# Patient Record
Sex: MALE | Race: WHITE | NOT HISPANIC OR LATINO | Employment: PART TIME
[De-identification: names, ages, dates, MRNs, and addresses within clinical notes are randomized per-mention and may not be internally consistent; named-entity substitution may affect disease eponyms.]

---

## 2017-02-14 ENCOUNTER — RECORDS - HEALTHEAST (OUTPATIENT)
Dept: ADMINISTRATIVE | Facility: OTHER | Age: 77
End: 2017-02-14

## 2017-03-01 ENCOUNTER — RECORDS - HEALTHEAST (OUTPATIENT)
Dept: LAB | Facility: CLINIC | Age: 77
End: 2017-03-01

## 2017-03-01 LAB
CHOLEST SERPL-MCNC: 167 MG/DL
FASTING STATUS PATIENT QL REPORTED: YES
HDLC SERPL-MCNC: 46 MG/DL
LDLC SERPL CALC-MCNC: 89 MG/DL
TRIGL SERPL-MCNC: 158 MG/DL

## 2017-08-31 ENCOUNTER — RECORDS - HEALTHEAST (OUTPATIENT)
Dept: LAB | Facility: CLINIC | Age: 77
End: 2017-08-31

## 2017-08-31 LAB
CHOLEST SERPL-MCNC: 158 MG/DL
FASTING STATUS PATIENT QL REPORTED: YES
HDLC SERPL-MCNC: 42 MG/DL
LDLC SERPL CALC-MCNC: 74 MG/DL
TRIGL SERPL-MCNC: 212 MG/DL

## 2017-09-10 ENCOUNTER — COMMUNICATION - HEALTHEAST (OUTPATIENT)
Dept: INTERNAL MEDICINE | Facility: CLINIC | Age: 77
End: 2017-09-10

## 2017-09-10 DIAGNOSIS — E78.5 HYPERLIPEMIA: ICD-10-CM

## 2017-09-28 ENCOUNTER — OFFICE VISIT - HEALTHEAST (OUTPATIENT)
Dept: INTERNAL MEDICINE | Facility: CLINIC | Age: 77
End: 2017-09-28

## 2017-09-28 DIAGNOSIS — Z00.00 ROUTINE GENERAL MEDICAL EXAMINATION AT A HEALTH CARE FACILITY: ICD-10-CM

## 2017-09-28 ASSESSMENT — MIFFLIN-ST. JEOR: SCORE: 1779.73

## 2017-12-10 ENCOUNTER — COMMUNICATION - HEALTHEAST (OUTPATIENT)
Dept: INTERNAL MEDICINE | Facility: CLINIC | Age: 77
End: 2017-12-10

## 2017-12-10 DIAGNOSIS — E78.5 HYPERLIPEMIA: ICD-10-CM

## 2018-01-09 ENCOUNTER — RECORDS - HEALTHEAST (OUTPATIENT)
Dept: LAB | Facility: CLINIC | Age: 78
End: 2018-01-09

## 2018-01-09 LAB
ALBUMIN SERPL-MCNC: 4.1 G/DL (ref 3.5–5)
ALT SERPL W P-5'-P-CCNC: 59 U/L (ref 0–45)
AST SERPL W P-5'-P-CCNC: 46 U/L (ref 0–40)
C REACTIVE PROTEIN LHE: 0.2 MG/DL (ref 0–0.8)
CHOLEST SERPL-MCNC: 167 MG/DL
CREAT SERPL-MCNC: 0.98 MG/DL (ref 0.7–1.3)
ERYTHROCYTE [DISTWIDTH] IN BLOOD BY AUTOMATED COUNT: 13.2 % (ref 11–14.5)
ERYTHROCYTE [SEDIMENTATION RATE] IN BLOOD BY WESTERGREN METHOD: 14 MM/HR (ref 0–15)
FASTING STATUS PATIENT QL REPORTED: NO
GFR SERPL CREATININE-BSD FRML MDRD: >60 ML/MIN/1.73M2
HCT VFR BLD AUTO: 45.9 % (ref 40–54)
HDLC SERPL-MCNC: 44 MG/DL
HGB BLD-MCNC: 15.7 G/DL (ref 14–18)
LDLC SERPL CALC-MCNC: 90 MG/DL
MCH RBC QN AUTO: 29.2 PG (ref 27–34)
MCHC RBC AUTO-ENTMCNC: 34.2 G/DL (ref 32–36)
MCV RBC AUTO: 86 FL (ref 80–100)
PLATELET # BLD AUTO: 219 THOU/UL (ref 140–440)
PMV BLD AUTO: 11 FL (ref 8.5–12.5)
RBC # BLD AUTO: 5.37 MILL/UL (ref 4.4–6.2)
TRIGL SERPL-MCNC: 164 MG/DL
URATE SERPL-MCNC: 5.5 MG/DL (ref 3–8)
WBC: 5.8 THOU/UL (ref 4–11)

## 2018-01-10 LAB — 25(OH)D3 SERPL-MCNC: 72.7 NG/ML (ref 30–80)

## 2018-07-03 ENCOUNTER — RECORDS - HEALTHEAST (OUTPATIENT)
Dept: LAB | Facility: CLINIC | Age: 78
End: 2018-07-03

## 2018-07-03 LAB
25(OH)D3 SERPL-MCNC: 38.4 NG/ML (ref 30–80)
ALBUMIN SERPL-MCNC: 4.3 G/DL (ref 3.5–5)
ALT SERPL W P-5'-P-CCNC: 38 U/L (ref 0–45)
AST SERPL W P-5'-P-CCNC: 26 U/L (ref 0–40)
C REACTIVE PROTEIN LHE: 0.2 MG/DL (ref 0–0.8)
CHOLEST SERPL-MCNC: 171 MG/DL
CREAT SERPL-MCNC: 1.18 MG/DL (ref 0.7–1.3)
ERYTHROCYTE [DISTWIDTH] IN BLOOD BY AUTOMATED COUNT: 13.2 % (ref 11–14.5)
ERYTHROCYTE [SEDIMENTATION RATE] IN BLOOD BY WESTERGREN METHOD: 18 MM/HR (ref 0–15)
FASTING STATUS PATIENT QL REPORTED: YES
GFR SERPL CREATININE-BSD FRML MDRD: 60 ML/MIN/1.73M2
HCT VFR BLD AUTO: 45.9 % (ref 40–54)
HDLC SERPL-MCNC: 47 MG/DL
HGB BLD-MCNC: 15.6 G/DL (ref 14–18)
LDLC SERPL CALC-MCNC: 83 MG/DL
MCH RBC QN AUTO: 29.5 PG (ref 27–34)
MCHC RBC AUTO-ENTMCNC: 34 G/DL (ref 32–36)
MCV RBC AUTO: 87 FL (ref 80–100)
PLATELET # BLD AUTO: 216 THOU/UL (ref 140–440)
PMV BLD AUTO: 10.2 FL (ref 8.5–12.5)
RBC # BLD AUTO: 5.29 MILL/UL (ref 4.4–6.2)
TRIGL SERPL-MCNC: 203 MG/DL
URATE SERPL-MCNC: 4.8 MG/DL (ref 3–8)
WBC: 6.6 THOU/UL (ref 4–11)

## 2018-09-09 ENCOUNTER — COMMUNICATION - HEALTHEAST (OUTPATIENT)
Dept: INTERNAL MEDICINE | Facility: CLINIC | Age: 78
End: 2018-09-09

## 2018-09-09 DIAGNOSIS — E78.5 HYPERLIPEMIA: ICD-10-CM

## 2018-12-11 ENCOUNTER — COMMUNICATION - HEALTHEAST (OUTPATIENT)
Dept: INTERNAL MEDICINE | Facility: CLINIC | Age: 78
End: 2018-12-11

## 2018-12-11 DIAGNOSIS — E78.5 HYPERLIPEMIA: ICD-10-CM

## 2019-01-13 ENCOUNTER — COMMUNICATION - HEALTHEAST (OUTPATIENT)
Dept: INTERNAL MEDICINE | Facility: CLINIC | Age: 79
End: 2019-01-13

## 2019-01-13 DIAGNOSIS — E78.5 HYPERLIPEMIA: ICD-10-CM

## 2019-01-16 ENCOUNTER — RECORDS - HEALTHEAST (OUTPATIENT)
Dept: LAB | Facility: CLINIC | Age: 79
End: 2019-01-16

## 2019-01-16 LAB
ALBUMIN SERPL-MCNC: 4.2 G/DL (ref 3.5–5)
ALT SERPL W P-5'-P-CCNC: 44 U/L (ref 0–45)
AST SERPL W P-5'-P-CCNC: 33 U/L (ref 0–40)
C REACTIVE PROTEIN LHE: 0.2 MG/DL (ref 0–0.8)
CHOLEST SERPL-MCNC: 166 MG/DL
CREAT SERPL-MCNC: 1.08 MG/DL (ref 0.7–1.3)
ERYTHROCYTE [DISTWIDTH] IN BLOOD BY AUTOMATED COUNT: 13 % (ref 11–14.5)
ERYTHROCYTE [SEDIMENTATION RATE] IN BLOOD BY WESTERGREN METHOD: 11 MM/HR (ref 0–15)
FASTING STATUS PATIENT QL REPORTED: NO
GFR SERPL CREATININE-BSD FRML MDRD: >60 ML/MIN/1.73M2
HCT VFR BLD AUTO: 44.9 % (ref 40–54)
HDLC SERPL-MCNC: 45 MG/DL
HGB BLD-MCNC: 15.3 G/DL (ref 14–18)
LDLC SERPL CALC-MCNC: 70 MG/DL
MCH RBC QN AUTO: 29.7 PG (ref 27–34)
MCHC RBC AUTO-ENTMCNC: 34.1 G/DL (ref 32–36)
MCV RBC AUTO: 87 FL (ref 80–100)
PLATELET # BLD AUTO: 148 THOU/UL (ref 140–440)
PMV BLD AUTO: 11.5 FL (ref 8.5–12.5)
RBC # BLD AUTO: 5.16 MILL/UL (ref 4.4–6.2)
TRIGL SERPL-MCNC: 255 MG/DL
URATE SERPL-MCNC: 5.4 MG/DL (ref 3–8)
WBC: 7.1 THOU/UL (ref 4–11)

## 2019-01-17 LAB — 25(OH)D3 SERPL-MCNC: 39.3 NG/ML (ref 30–80)

## 2019-01-26 ENCOUNTER — COMMUNICATION - HEALTHEAST (OUTPATIENT)
Dept: INTERNAL MEDICINE | Facility: CLINIC | Age: 79
End: 2019-01-26

## 2019-01-26 DIAGNOSIS — E78.5 HYPERLIPEMIA: ICD-10-CM

## 2019-02-01 ENCOUNTER — OFFICE VISIT - HEALTHEAST (OUTPATIENT)
Dept: INTERNAL MEDICINE | Facility: CLINIC | Age: 79
End: 2019-02-01

## 2019-02-01 DIAGNOSIS — E78.5 HYPERLIPEMIA: ICD-10-CM

## 2019-04-20 ENCOUNTER — COMMUNICATION - HEALTHEAST (OUTPATIENT)
Dept: INTERNAL MEDICINE | Facility: CLINIC | Age: 79
End: 2019-04-20

## 2019-04-20 DIAGNOSIS — E78.5 HYPERLIPEMIA: ICD-10-CM

## 2019-07-08 ENCOUNTER — RECORDS - HEALTHEAST (OUTPATIENT)
Dept: ADMINISTRATIVE | Facility: OTHER | Age: 79
End: 2019-07-08

## 2019-07-09 ENCOUNTER — RECORDS - HEALTHEAST (OUTPATIENT)
Dept: LAB | Facility: CLINIC | Age: 79
End: 2019-07-09

## 2019-07-09 LAB
25(OH)D3 SERPL-MCNC: 43 NG/ML (ref 30–80)
ALBUMIN SERPL-MCNC: 4.1 G/DL (ref 3.5–5)
ALT SERPL W P-5'-P-CCNC: 32 U/L (ref 0–45)
AST SERPL W P-5'-P-CCNC: 29 U/L (ref 0–40)
C REACTIVE PROTEIN LHE: 0.2 MG/DL (ref 0–0.8)
CHOLEST SERPL-MCNC: 162 MG/DL
CREAT SERPL-MCNC: 0.89 MG/DL (ref 0.7–1.3)
ERYTHROCYTE [DISTWIDTH] IN BLOOD BY AUTOMATED COUNT: 12.8 % (ref 11–14.5)
ERYTHROCYTE [SEDIMENTATION RATE] IN BLOOD BY WESTERGREN METHOD: 14 MM/HR (ref 0–15)
FASTING STATUS PATIENT QL REPORTED: YES
GFR SERPL CREATININE-BSD FRML MDRD: >60 ML/MIN/1.73M2
HCT VFR BLD AUTO: 43.7 % (ref 40–54)
HDLC SERPL-MCNC: 45 MG/DL
HGB BLD-MCNC: 14.8 G/DL (ref 14–18)
LDLC SERPL CALC-MCNC: 89 MG/DL
MCH RBC QN AUTO: 29.3 PG (ref 27–34)
MCHC RBC AUTO-ENTMCNC: 33.9 G/DL (ref 32–36)
MCV RBC AUTO: 87 FL (ref 80–100)
PLATELET # BLD AUTO: 216 THOU/UL (ref 140–440)
PMV BLD AUTO: 10.8 FL (ref 8.5–12.5)
RBC # BLD AUTO: 5.05 MILL/UL (ref 4.4–6.2)
TRIGL SERPL-MCNC: 138 MG/DL
URATE SERPL-MCNC: 4.9 MG/DL (ref 3–8)
WBC: 6 THOU/UL (ref 4–11)

## 2020-01-07 ENCOUNTER — RECORDS - HEALTHEAST (OUTPATIENT)
Dept: LAB | Facility: CLINIC | Age: 80
End: 2020-01-07

## 2020-01-07 LAB
ALBUMIN SERPL-MCNC: 4.4 G/DL (ref 3.5–5)
ALT SERPL W P-5'-P-CCNC: 34 U/L (ref 0–45)
AST SERPL W P-5'-P-CCNC: 24 U/L (ref 0–40)
C REACTIVE PROTEIN LHE: 0.2 MG/DL (ref 0–0.8)
CHOLEST SERPL-MCNC: 157 MG/DL
CREAT SERPL-MCNC: 0.95 MG/DL (ref 0.7–1.3)
ERYTHROCYTE [DISTWIDTH] IN BLOOD BY AUTOMATED COUNT: 13 % (ref 11–14.5)
ERYTHROCYTE [SEDIMENTATION RATE] IN BLOOD BY WESTERGREN METHOD: 16 MM/HR (ref 0–15)
FASTING STATUS PATIENT QL REPORTED: YES
GFR SERPL CREATININE-BSD FRML MDRD: >60 ML/MIN/1.73M2
HCT VFR BLD AUTO: 45.2 % (ref 40–54)
HDLC SERPL-MCNC: 46 MG/DL
HGB BLD-MCNC: 15.3 G/DL (ref 14–18)
LDLC SERPL CALC-MCNC: 90 MG/DL
MCH RBC QN AUTO: 29.2 PG (ref 27–34)
MCHC RBC AUTO-ENTMCNC: 33.8 G/DL (ref 32–36)
MCV RBC AUTO: 86 FL (ref 80–100)
PLATELET # BLD AUTO: 232 THOU/UL (ref 140–440)
PMV BLD AUTO: 11 FL (ref 8.5–12.5)
RBC # BLD AUTO: 5.24 MILL/UL (ref 4.4–6.2)
TRIGL SERPL-MCNC: 107 MG/DL
URATE SERPL-MCNC: 5.4 MG/DL (ref 3–8)
WBC: 5.6 THOU/UL (ref 4–11)

## 2020-01-08 ENCOUNTER — COMMUNICATION - HEALTHEAST (OUTPATIENT)
Dept: INTERNAL MEDICINE | Facility: CLINIC | Age: 80
End: 2020-01-08

## 2020-01-08 DIAGNOSIS — E78.5 HYPERLIPEMIA: ICD-10-CM

## 2020-01-08 LAB — 25(OH)D3 SERPL-MCNC: 51.3 NG/ML (ref 30–80)

## 2020-07-09 ENCOUNTER — RECORDS - HEALTHEAST (OUTPATIENT)
Dept: LAB | Facility: CLINIC | Age: 80
End: 2020-07-09

## 2020-07-09 LAB
ALBUMIN SERPL-MCNC: 4.3 G/DL (ref 3.5–5)
ALT SERPL W P-5'-P-CCNC: 32 U/L (ref 0–45)
AST SERPL W P-5'-P-CCNC: 21 U/L (ref 0–40)
C REACTIVE PROTEIN LHE: 0.5 MG/DL (ref 0–0.8)
CALCIUM SERPL-MCNC: 10 MG/DL (ref 8.5–10.5)
CREAT SERPL-MCNC: 0.94 MG/DL (ref 0.7–1.3)
ERYTHROCYTE [DISTWIDTH] IN BLOOD BY AUTOMATED COUNT: 13.2 % (ref 11–14.5)
ERYTHROCYTE [SEDIMENTATION RATE] IN BLOOD BY WESTERGREN METHOD: 17 MM/HR (ref 0–15)
GFR SERPL CREATININE-BSD FRML MDRD: >60 ML/MIN/1.73M2
HCT VFR BLD AUTO: 46.7 % (ref 40–54)
HGB BLD-MCNC: 15.6 G/DL (ref 14–18)
MCH RBC QN AUTO: 29.6 PG (ref 27–34)
MCHC RBC AUTO-ENTMCNC: 33.4 G/DL (ref 32–36)
MCV RBC AUTO: 89 FL (ref 80–100)
PLATELET # BLD AUTO: 229 THOU/UL (ref 140–440)
PMV BLD AUTO: 11 FL (ref 8.5–12.5)
RBC # BLD AUTO: 5.27 MILL/UL (ref 4.4–6.2)
URATE SERPL-MCNC: 5.7 MG/DL (ref 3–8)
WBC: 7 THOU/UL (ref 4–11)

## 2020-07-10 LAB — 25(OH)D3 SERPL-MCNC: 64.7 NG/ML (ref 30–80)

## 2020-10-31 ENCOUNTER — COMMUNICATION - HEALTHEAST (OUTPATIENT)
Dept: INTERNAL MEDICINE | Facility: CLINIC | Age: 80
End: 2020-10-31

## 2020-10-31 DIAGNOSIS — E78.5 HYPERLIPEMIA: ICD-10-CM

## 2020-11-04 RX ORDER — SIMVASTATIN 20 MG
TABLET ORAL
Qty: 90 TABLET | Refills: 2 | Status: SHIPPED | OUTPATIENT
Start: 2020-11-04 | End: 2021-08-26

## 2021-01-15 ENCOUNTER — COMMUNICATION - HEALTHEAST (OUTPATIENT)
Dept: TELEHEALTH | Facility: CLINIC | Age: 81
End: 2021-01-15

## 2021-01-15 ENCOUNTER — OFFICE VISIT - HEALTHEAST (OUTPATIENT)
Dept: INTERNAL MEDICINE | Facility: CLINIC | Age: 81
End: 2021-01-15

## 2021-01-15 DIAGNOSIS — I65.23 BILATERAL CAROTID ARTERY STENOSIS: ICD-10-CM

## 2021-01-15 DIAGNOSIS — Z00.00 ROUTINE GENERAL MEDICAL EXAMINATION AT A HEALTH CARE FACILITY: ICD-10-CM

## 2021-01-15 DIAGNOSIS — K42.9 UMBILICAL HERNIA WITHOUT OBSTRUCTION AND WITHOUT GANGRENE: ICD-10-CM

## 2021-01-15 DIAGNOSIS — Z00.01 ENCOUNTER FOR GENERAL ADULT MEDICAL EXAMINATION WITH ABNORMAL FINDINGS: ICD-10-CM

## 2021-01-15 DIAGNOSIS — M10.9 ACUTE GOUTY ARTHRITIS: ICD-10-CM

## 2021-01-15 DIAGNOSIS — E78.2 MIXED HYPERLIPIDEMIA: ICD-10-CM

## 2021-01-15 DIAGNOSIS — I10 BENIGN ESSENTIAL HYPERTENSION: ICD-10-CM

## 2021-01-15 DIAGNOSIS — I66.9 CEREBRAL ARTERY OCCLUSION: ICD-10-CM

## 2021-01-15 LAB
ALBUMIN SERPL-MCNC: 4.4 G/DL (ref 3.5–5)
ALP SERPL-CCNC: 71 U/L (ref 45–120)
ALT SERPL W P-5'-P-CCNC: 50 U/L (ref 0–45)
ANION GAP SERPL CALCULATED.3IONS-SCNC: 10 MMOL/L (ref 5–18)
AST SERPL W P-5'-P-CCNC: 37 U/L (ref 0–40)
BILIRUB SERPL-MCNC: 0.6 MG/DL (ref 0–1)
BUN SERPL-MCNC: 14 MG/DL (ref 8–28)
CALCIUM SERPL-MCNC: 10.2 MG/DL (ref 8.5–10.5)
CHLORIDE BLD-SCNC: 102 MMOL/L (ref 98–107)
CHOLEST SERPL-MCNC: 151 MG/DL
CO2 SERPL-SCNC: 25 MMOL/L (ref 22–31)
CREAT SERPL-MCNC: 1.03 MG/DL (ref 0.7–1.3)
FASTING STATUS PATIENT QL REPORTED: YES
GFR SERPL CREATININE-BSD FRML MDRD: >60 ML/MIN/1.73M2
GLUCOSE BLD-MCNC: 145 MG/DL (ref 70–125)
HDLC SERPL-MCNC: 46 MG/DL
LDLC SERPL CALC-MCNC: 82 MG/DL
POTASSIUM BLD-SCNC: 4.2 MMOL/L (ref 3.5–5)
PROT SERPL-MCNC: 7.7 G/DL (ref 6–8)
SODIUM SERPL-SCNC: 137 MMOL/L (ref 136–145)
TRIGL SERPL-MCNC: 115 MG/DL
URATE SERPL-MCNC: 5.6 MG/DL (ref 3–8)

## 2021-01-15 ASSESSMENT — MIFFLIN-ST. JEOR: SCORE: 1712.25

## 2021-01-18 ENCOUNTER — HOSPITAL ENCOUNTER (OUTPATIENT)
Dept: ULTRASOUND IMAGING | Facility: CLINIC | Age: 81
Discharge: HOME | End: 2021-01-18
Attending: INTERNAL MEDICINE

## 2021-01-18 DIAGNOSIS — I65.23 BILATERAL CAROTID ARTERY STENOSIS: ICD-10-CM

## 2021-01-27 ENCOUNTER — SURGERY - HEALTHEAST (OUTPATIENT)
Dept: SURGERY | Facility: CLINIC | Age: 81
End: 2021-01-27

## 2021-01-27 ENCOUNTER — AMBULATORY - HEALTHEAST (OUTPATIENT)
Dept: SURGERY | Facility: CLINIC | Age: 81
End: 2021-01-27

## 2021-01-27 ENCOUNTER — OFFICE VISIT - HEALTHEAST (OUTPATIENT)
Dept: SURGERY | Facility: CLINIC | Age: 81
End: 2021-01-27

## 2021-01-27 ENCOUNTER — COMMUNICATION - HEALTHEAST (OUTPATIENT)
Dept: SURGERY | Facility: CLINIC | Age: 81
End: 2021-01-27

## 2021-01-27 DIAGNOSIS — K43.9 VENTRAL HERNIA WITHOUT OBSTRUCTION OR GANGRENE: ICD-10-CM

## 2021-01-27 DIAGNOSIS — Z11.59 ENCOUNTER FOR SCREENING FOR OTHER VIRAL DISEASES: ICD-10-CM

## 2021-01-27 ASSESSMENT — MIFFLIN-ST. JEOR: SCORE: 1720.76

## 2021-01-28 ENCOUNTER — COMMUNICATION - HEALTHEAST (OUTPATIENT)
Dept: INTERNAL MEDICINE | Facility: CLINIC | Age: 81
End: 2021-01-28

## 2021-02-05 ENCOUNTER — OFFICE VISIT - HEALTHEAST (OUTPATIENT)
Dept: INTERNAL MEDICINE | Facility: CLINIC | Age: 81
End: 2021-02-05

## 2021-02-05 DIAGNOSIS — I10 BENIGN ESSENTIAL HYPERTENSION: ICD-10-CM

## 2021-02-05 DIAGNOSIS — Z01.818 PRE-OP EXAM: ICD-10-CM

## 2021-02-05 DIAGNOSIS — Z01.818 PREOP GENERAL PHYSICAL EXAM: ICD-10-CM

## 2021-02-05 DIAGNOSIS — M10.9 ACUTE GOUTY ARTHRITIS: ICD-10-CM

## 2021-02-05 LAB
ATRIAL RATE - MUSE: 47 BPM
DIASTOLIC BLOOD PRESSURE - MUSE: NORMAL
INTERPRETATION ECG - MUSE: NORMAL
P AXIS - MUSE: -29 DEGREES
PR INTERVAL - MUSE: 280 MS
QRS DURATION - MUSE: 84 MS
QT - MUSE: 466 MS
QTC - MUSE: 412 MS
R AXIS - MUSE: -28 DEGREES
SYSTOLIC BLOOD PRESSURE - MUSE: NORMAL
T AXIS - MUSE: -27 DEGREES
VENTRICULAR RATE- MUSE: 47 BPM

## 2021-02-05 RX ORDER — ALLOPURINOL 300 MG/1
300 TABLET ORAL DAILY
Qty: 90 TABLET | Refills: 3 | Status: SHIPPED | OUTPATIENT
Start: 2021-02-05 | End: 2022-02-24

## 2021-02-05 RX ORDER — AMLODIPINE BESYLATE 5 MG/1
TABLET ORAL
Qty: 90 TABLET | Refills: 3 | Status: SHIPPED | OUTPATIENT
Start: 2021-02-05

## 2021-02-06 ENCOUNTER — AMBULATORY - HEALTHEAST (OUTPATIENT)
Dept: FAMILY MEDICINE | Facility: CLINIC | Age: 81
End: 2021-02-06

## 2021-02-06 DIAGNOSIS — Z11.59 ENCOUNTER FOR SCREENING FOR OTHER VIRAL DISEASES: ICD-10-CM

## 2021-02-07 LAB
SARS-COV-2 PCR COMMENT: NORMAL
SARS-COV-2 RNA SPEC QL NAA+PROBE: NEGATIVE
SARS-COV-2 VIRUS SPECIMEN SOURCE: NORMAL

## 2021-02-08 ENCOUNTER — ANESTHESIA - HEALTHEAST (OUTPATIENT)
Dept: SURGERY | Facility: HOSPITAL | Age: 81
End: 2021-02-08

## 2021-02-08 ENCOUNTER — COMMUNICATION - HEALTHEAST (OUTPATIENT)
Dept: SCHEDULING | Facility: CLINIC | Age: 81
End: 2021-02-08

## 2021-02-09 ENCOUNTER — SURGERY - HEALTHEAST (OUTPATIENT)
Dept: SURGERY | Facility: HOSPITAL | Age: 81
End: 2021-02-09

## 2021-02-12 ENCOUNTER — COMMUNICATION - HEALTHEAST (OUTPATIENT)
Dept: SCHEDULING | Facility: CLINIC | Age: 81
End: 2021-02-12

## 2021-02-24 ENCOUNTER — OFFICE VISIT - HEALTHEAST (OUTPATIENT)
Dept: SURGERY | Facility: CLINIC | Age: 81
End: 2021-02-24

## 2021-02-24 DIAGNOSIS — Z98.890 POSTOPERATIVE STATE: ICD-10-CM

## 2021-03-24 ENCOUNTER — OFFICE VISIT - HEALTHEAST (OUTPATIENT)
Dept: SURGERY | Facility: CLINIC | Age: 81
End: 2021-03-24

## 2021-03-24 DIAGNOSIS — Z98.890 POSTOPERATIVE STATE: ICD-10-CM

## 2021-05-31 VITALS — WEIGHT: 234 LBS | HEIGHT: 70 IN | BODY MASS INDEX: 33.5 KG/M2

## 2021-06-02 VITALS — BODY MASS INDEX: 33.33 KG/M2 | WEIGHT: 229 LBS

## 2021-06-05 VITALS
SYSTOLIC BLOOD PRESSURE: 154 MMHG | DIASTOLIC BLOOD PRESSURE: 80 MMHG | HEIGHT: 69 IN | BODY MASS INDEX: 32.58 KG/M2 | HEART RATE: 60 BPM | WEIGHT: 220 LBS

## 2021-06-05 VITALS — WEIGHT: 223 LBS | BODY MASS INDEX: 32.46 KG/M2

## 2021-06-05 VITALS
SYSTOLIC BLOOD PRESSURE: 120 MMHG | HEIGHT: 70 IN | BODY MASS INDEX: 31.64 KG/M2 | DIASTOLIC BLOOD PRESSURE: 80 MMHG | WEIGHT: 221 LBS

## 2021-06-05 VITALS
HEART RATE: 64 BPM | DIASTOLIC BLOOD PRESSURE: 78 MMHG | BODY MASS INDEX: 32.46 KG/M2 | WEIGHT: 223 LBS | SYSTOLIC BLOOD PRESSURE: 132 MMHG

## 2021-06-05 NOTE — TELEPHONE ENCOUNTER
Refill Approved    Rx renewed per Medication Renewal Policy. Medication was last renewed on 2/1/19.    Cindy Ward, [facility] Connection Triage/Med Refill 1/8/2020     Requested Prescriptions   Pending Prescriptions Disp Refills     simvastatin (ZOCOR) 20 MG tablet [Pharmacy Med Name: SIMVASTATIN 20MG TABLETS] 90 tablet 3     Sig: TAKE 1 TABLET(20 MG) BY MOUTH AT BEDTIME       Statins Refill Protocol (Hmg CoA Reductase Inhibitors) Passed - 1/8/2020  5:07 AM        Passed - PCP or prescribing provider visit in past 12 months      Last office visit with prescriber/PCP: 2/1/2019 Yoandy Willis MD OR same dept: 2/1/2019 Yoandy Willis MD OR same specialty: 2/1/2019 Yoandy Willis MD  Last physical: 9/28/2017 Last MTM visit: Visit date not found   Next visit within 3 mo: Visit date not found  Next physical within 3 mo: Visit date not found  Prescriber OR PCP: Yoandy Willis MD  Last diagnosis associated with med order: 1. Hyperlipemia  - simvastatin (ZOCOR) 20 MG tablet [Pharmacy Med Name: SIMVASTATIN 20MG TABLETS]; TAKE 1 TABLET(20 MG) BY MOUTH AT BEDTIME  Dispense: 90 tablet; Refill: 3    If protocol passes may refill for 12 months if within 3 months of last provider visit (or a total of 15 months).

## 2021-06-12 NOTE — TELEPHONE ENCOUNTER
RN cannot approve Refill Request    RN can NOT refill this medication Protocol failed and NO refill given. Last office visit: 2/1/2019 Yoandy Willis MD Last Physical: 9/28/2017 Last MTM visit: Visit date not found Last visit same specialty: 2/1/2019 Yoandy Willis MD.  Next visit within 3 mo: Visit date not found  Next physical within 3 mo: Visit date not found      Carmen Murdock, [facility] [place] Connection Triage/Med Refill 11/3/2020    Requested Prescriptions   Pending Prescriptions Disp Refills     simvastatin (ZOCOR) 20 MG tablet [Pharmacy Med Name: SIMVASTATIN 20MG TABLETS] 90 tablet 2     Sig: TAKE 1 TABLET(20 MG) BY MOUTH AT BEDTIME       Statins Refill Protocol (Hmg CoA Reductase Inhibitors) Failed - 10/31/2020  8:44 PM        Failed - PCP or prescribing provider visit in past 12 months      Last office visit with prescriber/PCP: 2/1/2019 Yoandy Willis MD OR same dept: Visit date not found OR same specialty: 2/1/2019 Yoandy Willis MD  Last physical: 9/28/2017 Last MTM visit: Visit date not found   Next visit within 3 mo: Visit date not found  Next physical within 3 mo: Visit date not found  Prescriber OR PCP: Yoandy Willis MD  Last diagnosis associated with med order: 1. Hyperlipemia  - simvastatin (ZOCOR) 20 MG tablet [Pharmacy Med Name: SIMVASTATIN 20MG TABLETS]; TAKE 1 TABLET(20 MG) BY MOUTH AT BEDTIME  Dispense: 90 tablet; Refill: 2    If protocol passes may refill for 12 months if within 3 months of last provider visit (or a total of 15 months).

## 2021-06-13 NOTE — PROGRESS NOTES
Steven presents today for a physical exam.  Please see the physical exam forms a and B for further details, including a complete review of systems.    ILLNESSES, HOSPITALIZATIONS, AND OPERATIONS:    #1.  Psoriatic arthritis, well controlled off medication.  2.  Hypertension, on amlodipine.  3.  Hyperlipidemia.  4.  History of carotid artery stenosis status post carotid endarterectomy in 2013.  5.  History of CVA in 2012, no residual deficits.    Steven feels well today and has no acute complaints.  He is due for some immunizations.  Blood pressure is excellent and he would like to decrease his dose of amlodipine in half.  Again he is otherwise well.    OBJECTIVE:    In general the patient is alert pleasant and in no acute distress.    HEENT: Pupils equal round reactive light.  Oropharynx is clear.  Lymphatic shows no anterior posterior cervical lymphadenopathy.  No thyromegaly or other masses noted.    Cardiovascular: S1-S2 regular in rhythm no murmurs gallops rubs    Lungs are clear    Abdomen is soft nontender nondistended.  No HSM.    Extremities show no pedal edema present bilaterally.  DP pulses are 2+ and normal bilaterally.    Skin exam shows no concerning skin lesions.    Assessment and plan: Physical exam along with healthcare maintenance.  He was given his tetanus, flu, and pneumonia vaccines.  Colon cancer screening and prostate cancer screening are not needed at his age.  We will check a fasting lipid profile and a CMP.  Follow-up 1 year, earlier if needed

## 2021-06-14 NOTE — PROGRESS NOTES
Assessment and Plan:     1. Benign essential hypertension`    Blood pressure elevated today, but patient states that he is having a lot of anxiety today as well.  He is going to make an appointment in 2 weeks for nurse visit.  If his blood pressure continues to be elevated we will start him on either a thiazide diuretic or an ACE inhibitor.    - Comprehensive Metabolic Panel    2. Mixed hyperlipidemia    - Lipid Cascade    3. Ischemic Stroke    Secondary risk factor modification.    4. Routine general medical examination at a health care facility      5. Gouty arthritis    - Uric Acid    6. Umbilical hernia without obstruction and without gangrene    He desires surgical repair of this and thus I referred him to general surgery.    - Ambulatory referral to General Surgery     The patient's current medical problems were reviewed.      The following health maintenance schedule was reviewed with the patient and provided in printed form in the after visit summary:   Health Maintenance   Topic Date Due     HEPATITIS C SCREENING  1940     ZOSTER VACCINES (1 of 2) 07/13/1992     MEDICARE ANNUAL WELLNESS VISIT  09/28/2018     INFLUENZA VACCINE RULE BASED (1) 08/01/2020     ADVANCE CARE PLANNING  09/29/2021     FALL RISK ASSESSMENT  01/15/2022     LIPID  01/07/2025     TD 18+ HE  09/28/2027     Pneumococcal Vaccine: 65+ Years  Completed     Pneumococcal Vaccine: Pediatrics (0 to 5 Years) and At-Risk Patients (6 to 64 Years)  Aged Out     HEPATITIS B VACCINES  Aged Out        Subjective:   Chief Complaint: Steven Booker is an 80 y.o. male here for an Annual Wellness visit.   HPI: Steven comes in today for his yearly physical.  He is feeling well today and has no acute complaints.  He states that he has been dealing with an umbilical hernia for about the last 2years and it has slightly been enlarging.  He would like surgical repair of this if at all possible.  He has a history of psoriatic arthritis, in remission, and also  history of gout which she is on allopurinol prophylaxis for.  Due for labs today.  History of hypertension, on 10 mg of amlodipine.  History of vascular disease, secondary risk factor modification.    Review of Systems:   Please see above.  The rest of the review of systems are negative for all systems.    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 No pertinent past medical history.   Past Surgical History:   Procedure Laterality Date     [facility] THROMBOENDARTECTMY NECK,NECK INCIS      Description: Carotid Thromboendarterectomy;  Recorded: 12/02/2013;      No family history on file.   Social History     Socioeconomic History     Marital status:      Spouse name: Not on file     Number of children: Not on file     Years of education: Not on file     Highest education level: Not on file   Occupational History     Not on file   Social Needs     Financial resource strain: Not on file     Food insecurity     Worry: Not on file     Inability: Not on file     Transportation needs     Medical: Not on file     Non-medical: Not on file   Tobacco Use     Smoking status: Light Tobacco Smoker     Types: Cigars     Smokeless tobacco: Never Used   Substance and Sexual Activity     Alcohol use: Not on file     Drug use: Not on file     Sexual activity: Not on file   Lifestyle     Physical activity     Days per week: Not on file     Minutes per session: Not on file     Stress: Not on file   Relationships     Social connections     Talks on phone: Not on file     Gets together: Not on file     Attends Yazidi service: Not on file     Active member of club or organization: Not on file     Attends meetings of  "clubs or organizations: Not on file     Relationship status: Not on file     Intimate partner violence     Fear of current or ex partner: Not on file     Emotionally abused: Not on file     Physically abused: Not on file     Forced sexual activity: Not on file   Other Topics Concern     Not on file   Social History Narrative     Not on file      Current Outpatient Medications   Medication Sig Dispense Refill     allopurinol (ZYLOPRIM) 300 MG tablet Take 300 mg by mouth daily.        amLODIPine (NORVASC) 5 MG tablet TK 2 TS PO D  0     aspirin 81 MG EC tablet Take 81 mg by mouth daily.       cholecalciferol, vitamin D3, (VITAMIN D3) 2,000 unit capsule Take 2,000 Units by mouth daily.       simvastatin (ZOCOR) 20 MG tablet TAKE 1 TABLET(20 MG) BY MOUTH AT BEDTIME 90 tablet 2     No current facility-administered medications for this visit.       Objective:   Vital Signs:   Visit Vitals  /78   Pulse 60   Ht 5' 9.25\" (1.759 m)   Wt 220 lb (99.8 kg)   BMI 32.25 kg/m           VisionScreening:  No exam data present     PHYSICAL EXAM  OBJECTIVE:    In general the patient is alert pleasant and in no acute distress.    HEENT: Pupils equal round reactive light.  Oropharynx is clear.  Lymphatic shows no anterior posterior cervical lymphadenopathy.  No thyromegaly or other masses noted.    Cardiovascular: S1-S2 regular in rhythm no murmurs gallops rubs    Lungs are clear    Abdomen is soft nontender nondistended.  No HSM.    Extremities show no pedal edema present bilaterally.  DP pulses are 2+ and normal bilaterally.    Skin exam shows no concerning skin lesions.    Assessment Results 1/15/2021   Activities of Daily Living No help needed   Instrumental Activities of Daily Living No help needed   Mini Cog Total Score 5   Some recent data might be hidden     A Mini-Cog score of 0-2 suggests the possibility of dementia, score of 3-5 suggests no dementia    Identified Health Risks:     The patient was provided with written " information regarding signs of hearing loss.  Information regarding advance directives (living posada), including where he can download the appropriate form, was provided to the patient via the AVS.

## 2021-06-14 NOTE — PATIENT INSTRUCTIONS - HE
Hernia education & surgery packet provided to pt.    JUANITO Peguero [facility]  P: [phone] I F: [phone]

## 2021-06-14 NOTE — PROGRESS NOTES
HPI:  Steven Booker is a 80 y.o. male who was referred to me by Yoandy Willis MD for a ventral hernia.  He presents with complaints of a bulge in the periumbilical region with increasingly enlargement and dicomfort.   He has noted this for the past several years.  He denies any nausea, vomiting, fevers, chills, bloody bowel movements or any other complaints at this time.  Allergies:Patient has no known allergies.    No past medical history on file.    Past Surgical History:   Procedure Laterality Date     AR THROMBOENDARTECTMY NECK,NECK INCIS      Description: Carotid Thromboendarterectomy;  Recorded: 12/02/2013;       CURRENT MEDS:  Current Outpatient Medications   Medication Sig Dispense Refill     allopurinol (ZYLOPRIM) 300 MG tablet Take 300 mg by mouth daily.        amLODIPine (NORVASC) 5 MG tablet TK 2 TS PO D  0     aspirin 81 MG EC tablet Take 81 mg by mouth daily.       cholecalciferol, vitamin D3, (VITAMIN D3) 2,000 unit capsule Take 2,000 Units by mouth daily.       simvastatin (ZOCOR) 20 MG tablet TAKE 1 TABLET(20 MG) BY MOUTH AT BEDTIME 90 tablet 2     No current facility-administered medications for this visit.        No family history on file.     reports that he has been smoking cigars. He has never used smokeless tobacco.    Review of Systems -   The 12 point review of systems  is within normal limits except for as mentioned above in the HPI.  General ROS: No complaints or constitutional symptoms  Ophthalmic ROS: No complaints of visual changes  Skin: No complaints or symptoms   Endocrine: No complaints or symptoms  Hematologic/Lymphatic: No symptoms or complaints  Psychiatric: No symptoms or complaints  Respiratory ROS: no cough, shortness of breath, or wheezing  Cardiovascular ROS: no chest pain or dyspnea on exertion  Gastrointestinal ROS: As per HPI  Genito-Urinary ROS: no dysuria, trouble voiding, or hematuria  Musculoskeletal ROS: no joint or muscle pain  Neurological ROS: no TIA or  "stroke symptoms      /80   Ht 5' 9.5\" (1.765 m)   Wt 221 lb (100.2 kg)   BMI 32.17 kg/m    Body mass index is 32.17 kg/m .    EXAM:  GENERAL: Well developed male  HEENT: Extra ocular muscles intact, pupils are round and reactive, sclera is anicteric,   NECK:  No obvious masses or deformities  CARDIAC: RRR w/out murmur   CHEST/LUNG: Clear to auscultation bilaterally  ABDOMEN: Moderately sized ventral hernia with incarcerated periumbilical adipose tissue.  Defect measuring approximately 4 cm in lateral dimension  NEURO: No obvious defects noted.  EXT: No edema, no obvious deformities or any other abnormalities        Assessment/Plan:    Steven Booker is a 80 y.o. male with a moderately sized ventral hernia.  The pathophysiology of these hernias was discussed as were the surgical and non-operative management strategies.      The risks of surgery were discussed which include, but are not limited to, bleeding, infection, recurrent hernia, chronic pain, poor cosmesis, blood clots, stroke, heart attack and death.  Additionally, the risks of observation were discussed which include, but are not limited to, enlargement of the hernia, incarceration, strangulation, pain and death.  Surgical options including open, laparoscopic and robotic hernia repair were discussed in detail.      He understands everything which was discussed and has consented to proceed with surgery.  Because of the large nature of the ventral hernia the likelihood of success is smaller with an open repair.  We will therefore schedule a robotic repair of the hernia accordingly.      Orville Wade D.O. [facility]  [phone]  [facility] Department of Surgery  "

## 2021-06-15 NOTE — PROGRESS NOTES
[facility]  [address] [place] [id number]  Dept: [phone]  Dept Fax: [phone]  Primary Provider: Yoandy Willis MD  Pre-op Performing Provider: YOANDY WILLIS    PREOPERATIVE EVALUATION:  Today's date: 2/5/2021    Steven Booker is a 80 y.o. male who presents for a preoperative evaluation.    Surgical Information:  Surgery/Procedure: hernia repair  Surgery Location: [facility]  Surgeon: Dr. Wade  Surgery Date: 2/9/2021  Where patient plans to recover: At home with family  Fax number for surgical facility: Note does not need to be faxed, will be available electronically in Epic.    Type of Anesthesia Anticipated: to be determined    Assessment & Plan     The proposed surgical procedure is considered INTERMEDIATE risk.    Problem List Items Addressed This Visit     Gouty arthritis    Relevant Medications    allopurinoL (ZYLOPRIM) 300 MG tablet    Benign essential hypertension    Relevant Medications    amLODIPine (NORVASC) 5 MG tablet      Other Visit Diagnoses     Pre-op exam    -  Primary    Relevant Orders    Electrocardiogram Perform and Read (Completed)    Preop general physical exam                RECOMMENDATION:  APPROVAL GIVEN to proceed with proposed procedure, without further diagnostic evaluation.  Hold ASA/NSAIDS prior to surgery          Subjective     HPI related to upcoming procedure: Steven has a ventral hernia present which has been growing a bit and causing him some pain.  He is going to undergo surgical correction of this for definitive treatment.  History of a CVA in the past along with a history of peripheral vascular disease.  Blood pressure is under excellent control today.  Other risk factors are controlled as well.  No history of obstructive sleep apnea or underlying lung disease.    Preop Questions 2/3/2021   Have you ever had a heart attack or stroke? YES -    Have you ever had surgery on your heart or blood vessels, such as a stent  placement, a coronary artery bypass, or surgery on an artery in your head, neck, heart, or legs? YES -    Do you have chest pain with activity? No   Do you have a history of  heart failure? No   Do you currently have a cold, bronchitis or symptoms of other infection? No   Do you have a cough, shortness of breath, or wheezing? No   Do you or anyone in your family have previous history of blood clots? No   Do you or does anyone in your family have a serious bleeding problem such as prolonged bleeding following surgeries or cuts? No   Have you ever had problems with anemia or been told to take iron pills? No   Have you had any abnormal blood loss such as black, tarry or bloody stools? No   Have you ever had a blood transfusion? No   Are you willing to have a blood transfusion if it is medically needed before, during, or after your surgery? Yes   Have you or any of your relatives ever had problems with anesthesia? No   Do you have sleep apnea, excessive snoring or daytime drowsiness? No   Do you have any artifical heart valves or other implanted medical devices like a pacemaker, defibrillator, or continuous glucose monitor? No   Do you have artificial joints? No   Are you allergic to latex? No       Review of Systems  Constitutional, neuro, ENT, endocrine, pulmonary, cardiac, gastrointestinal, genitourinary, musculoskeletal, integument and psychiatric systems are negative, except as otherwise noted.      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  Past Surgical History:   Procedure Laterality Date     OK THROMBOENDARTECTMY NECK,NECK INCIS      Description: Carotid  Thromboendarterectomy;  Recorded: 12/02/2013;     Current Outpatient Medications   Medication Sig Dispense Refill     allopurinol (ZYLOPRIM) 300 MG tablet Take 300 mg by mouth daily.        amLODIPine (NORVASC) 5 MG tablet TK 2 TS PO D  0     aspirin 81 MG EC tablet Take 81 mg by mouth daily.       cholecalciferol, vitamin D3, (VITAMIN D3) 2,000 unit capsule Take 2,000 Units by mouth daily.       simvastatin (ZOCOR) 20 MG tablet TAKE 1 TABLET(20 MG) BY MOUTH AT BEDTIME 90 tablet 2     No current facility-administered medications for this visit.        No Known Allergies    Social History     Tobacco Use     Smoking status: Light Tobacco Smoker     Types: Cigars     Smokeless tobacco: Never Used   Substance Use Topics     Alcohol use: Not on file      No family history on file.  Social History     Substance and Sexual Activity   Drug Use Not on file        Objective     There were no vitals taken for this visit.  Physical Exam    GENERAL APPEARANCE: healthy, alert and no distress     EYES: EOMI,  PERRL     HENT: ear canals and TM's normal and nose and mouth without ulcers or lesions     NECK: no adenopathy, no asymmetry, masses, or scars and thyroid normal to palpation     RESP: lungs clear to auscultation - no rales, rhonchi or wheezes     CV: regular rates and rhythm, normal S1 S2, no S3 or S4 and no murmur, click or rub     ABDOMEN:  soft, nontender, no HSM or masses and bowel sounds normal     MS: extremities normal- no gross deformities noted, no evidence of inflammation in joints, FROM in all extremities.     SKIN: no suspicious lesions or rashes     NEURO: Normal strength and tone, sensory exam grossly normal, mentation intact and speech normal     PSYCH: mentation appears normal. and affect normal/bright     LYMPHATICS: No cervical adenopathy    Recent Labs   Lab Test 01/15/21  0955 07/09/20  1650 01/07/20  1135   HGB  --  15.6 15.3   PLT  --  229 232     --   --    K 4.2  --   --    CREATININE 1.03  0.94 0.95        PRE-OP Diagnostics:   No labs were ordered during this visit.  EKG: sinus bradycardia, normal axis, normal intervals, no acute ST/T changes c/w ischemia    REVISED CARDIAC RISK INDEX (RCRI)   The patient has the following serious cardiovascular risks for perioperative complications:   - Cerebrovascular Disease (TIA or CVA) = 1 point    RCRI INTERPRETATION: 1 point: Class II (low risk - 0.9% complication rate)           Signed Electronically by: Yoandy Willis MD    Copy of this evaluation report is provided to requesting physician.    [facility] Preop Guidelines    Revised Cardiac Risk Index

## 2021-06-15 NOTE — ANESTHESIA CARE TRANSFER NOTE
Last vitals:   Vitals:    02/09/21 0920   BP: 121/57   Pulse: (!) 50   Resp: 14   Temp: 37.4  C (99.4  F)   SpO2: 97%     Patient's level of consciousness is drowsy  Spontaneous respirations: yes  Maintains airway independently: yes  Dentition unchanged: yes  Oropharynx: oropharynx clear of all foreign objects    QCDR Measures:  ASA# 20 - Surgical Safety Checklist: WHO surgical safety checklist completed prior to induction    PQRS# 430 - Adult PONV Prevention: 4558F - Pt received => 2 anti-emetic agents (different classes) preop & intraop  ASA# 8 - Peds PONV Prevention: NA - Not pediatric patient, not GA or 2 or more risk factors NOT present  PQRS# 424 - Bertha-op Temp Management: 4559F - At least one body temp DOCUMENTED => 35.5C or 95.9F within required timeframe  PQRS# 426 - PACU Transfer Protocol: - Transfer of care checklist used  ASA# 14 - Acute Post-op Pain: ASA14B - Patient did NOT experience pain >= 7 out of 10

## 2021-06-15 NOTE — TELEPHONE ENCOUNTER
Pt is calling in after having Hernia Surgery on 2/9/2021. Pt reports he has not had a BM since Monday, and feels very bloated. Pt has not tried any care advice, and is taking narcotic pain pills. Discussed how narcotic pain medication affects constipation.   Care advice given, increasing fluids, using juices, and prune juice, increasing fiber in diet, and avoid constipating foods. Pt would like to try these things to see if they work.   Per protocol pt should be able to treat this at home. Pt was advised to call back if symptoms worsen, or if he develops abdominal pain. Pt verbalized understanding, and will call back if symptoms worsen, or if care advice is not working.    Galindo Rodriguez, RN [facility] Triage/Medication Refill    Reason for Disposition    Mild constipation    Additional Information    Negative: Abdomen pain is the main symptom and adult male    Negative: Abdomen pain is the main symptom and adult female    Negative: Rectal bleeding or blood in stool is the main symptom    Negative: Patient sounds very sick or weak to the triager    Negative: Constant abdominal pain lasting > 2 hours    Negative: Vomiting bile (green color)    Negative: Vomiting and abdomen looks much more swollen than usual    Negative: Rectal pain or fullness from fecal impaction (rectum full of stool) and NOT better after SITZ bath, suppository or enema    Negative: Abdomen is more swollen than usual    Negative: Last bowel movement (BM) > 4 days ago    Negative: Leaking stool    Negative: Intermittent mild abdominal pain and fever    Negative: Unable to have a bowel movement (BM) without manually removing stool (using finger to pull out stool or perform disimpaction)    Negative: Unable to have a bowel movement (BM) without using a laxative, suppository, or enema    Negative: Constipation persists > 1 week and no improvement after using CARE ADVICE    Negative: Weight loss greater than 10 pounds (5 kg) and not dieting     Negative: Pencil-like, narrow stools    Negative: Patient wants to be seen    Negative: Uses laxative (e.g., PEG / Miralax. milk of magnesia) or enema more than once a month    Negative: Constipation is a recurrent ongoing problem (i.e., < 3 BMs / week or straining > 25% of the time)    Negative: Minor bleeding from rectum (e.g., blood just on toilet paper, few drops, streaks on surface of normal formed BM) occurs more than twice    Protocols used: CONSTIPATION-A-OH

## 2021-06-15 NOTE — PROGRESS NOTES
Steven Booker is status post robotic ventral hernia repair. He is doing well with minimal to no post operative incisional pain.  He is tolerating a regular diet, ambulating with minimal pain, denies any recurrent bulges and has no other complaints at present.     EXAM:  There were no vitals taken for this visit.  GENERAL: Well developed male, No acute distress, pleasant and conversant   EYES: Pupils equal, round and reactive, no scleral icterus  ABDOMEN: Well-healed port site incisions x3, small seroma over umbilicus with small area of superficial erythema  SKIN: Pink, warm and dry, no obvious rashes or lesions   NEURO:No focal deficits, ambulatory  MUSCULOSKELETAL:No obvious deformities, no swelling, normal appearing      ASSESSMENT AND PLAN:  Steven Booker is doing well postoperatively.  He may continue with the recommended diet and light activities as tolerated. I have encouraged him to refrain from any heavy lifting over 20 pounds and strenuous activities or stretching for a total of 6 weeks from his surgery.   He does have some erythema over a small fluid collection in the umbilicus.  I will add Keflex to his medication regimen to take for the next 10 days.  I will have him follow-up with me in 2 weeks for ongoing evaluation.  Otherwise he is feeling great and has no complaints of discomfort or pain.    Orville Wade D.O. FACS  [phone]  Health system Department of Surgery

## 2021-06-15 NOTE — ANESTHESIA POSTPROCEDURE EVALUATION
Patient: Steven Booker  Procedure(s):  HERNIORRHAPHY, VENTRAL, ROBOT-ASSISTED, LAPAROSCOPIC, USING DA AMANDA XI  Anesthesia type: general    Patient location: PACU  Last vitals:   Vitals Value Taken Time   /63 02/09/21 1010   Temp  02/09/21 1014   Pulse 45 02/09/21 1013   Resp 13 02/09/21 1012   SpO2 100 % 02/09/21 1013   Vitals shown include unvalidated device data.  Post vital signs: stable  Level of consciousness: awake and responds to simple questions  Post-anesthesia pain: pain controlled  Post-anesthesia nausea and vomiting: no  Pulmonary: unassisted, face mask  Cardiovascular: stable, blood pressure at baseline and bradycardic  Hydration: adequate  Anesthetic events: no    QCDR Measures:  ASA# 11 - Bertha-op Cardiac Arrest: ASA11B - Patient did NOT experience unanticipated cardiac arrest  ASA# 12 - Bertha-op Mortality Rate: ASA12B - Patient did NOT die  ASA# 13 - PACU Re-Intubation Rate: ASA13B - Patient did NOT require a new airway mgmt  ASA# 10 - Composite Anes Safety: ASA10A - No serious adverse event    Additional Notes:

## 2021-06-15 NOTE — ANESTHESIA PREPROCEDURE EVALUATION
Anesthesia Evaluation      Patient summary reviewed   No history of anesthetic complications     Airway   Mallampati: III  Neck ROM: full   Pulmonary - negative ROS and normal exam                          Cardiovascular - normal exam  Exercise tolerance: > or = 4 METS  (+) hypertension, CAD, , hypercholesterolemia, PVD (s/p left carotid thromboendarterectomy 2013)    ECG reviewed        Neuro/Psych    (+) CVA (No residual deficits) ,     Endo/Other    (+) arthritis,      Comments: Gouty arthritis      GI/Hepatic/Renal    (-) GERD    Comments: Ventral hernia     Other findings: 01/18/21 0756 US Carotid Bilateral   Impression:     1. Mild plaque formation, velocities consistent with less than 50% stenosis in the right internal carotid artery.  2. Mild plaque formation, velocities consistent with less than 50% stenosis in the left internal carotid artery.  3. Flow within the vertebral arteries is antegrade.             Dental - normal exam                        Anesthesia Plan  Planned anesthetic: general endotracheal  Glidescope prn      For Pain:  Ketamine 30 mg unless surgeon orders ketamine infusion  Magnesium  Precedex    Smooth emergence titrate  precedex infusion and have lidocaine immediately available.  Reverse with Sugammadex    Decadron  Zofran      ASA 3   Induction: intravenous   Anesthetic plan and risks discussed with: patient  Anesthesia plan special considerations: antiemetics, dexmedetomidine  Post-op plan: routine recovery

## 2021-06-16 PROBLEM — I10 BENIGN ESSENTIAL HYPERTENSION: Status: ACTIVE | Noted: 2021-01-15

## 2021-06-16 PROBLEM — K43.9 VENTRAL HERNIA WITHOUT OBSTRUCTION OR GANGRENE: Status: ACTIVE | Noted: 2021-01-27

## 2021-06-16 PROBLEM — M10.9 ACUTE GOUTY ARTHRITIS: Status: ACTIVE | Noted: 2021-01-15

## 2021-06-16 PROBLEM — Z23 ENCOUNTER FOR IMMUNIZATION: Status: ACTIVE | Noted: 2021-01-29

## 2021-06-16 NOTE — PROGRESS NOTES
HPI: Steven Booker is here for follow up for evaluation of his ongoing spinal cellulitis.  He is doing much better with no complaints of pain.    Allergies, Medications, Social History, Past Medical History and Past Surgical History were reviewed and are noted in the chart.    There were no vitals taken for this visit.  There is no height or weight on file to calculate BMI.      EXAM:   GENERAL: Appears well  Abdomen-well-healed umbilical hernia sites with no evidence of cellulitis or infection    Incision 02/09/21 Surgical Abdomen (Active)   Dressing Band-aid;Dry gauze;Transparent film 02/09/21 1000   Dressing Status  Clean;Dry;Intact 02/09/21 1149       Assessment/Plan: Steven Booker continues to do well. His wound is healing and overall progressing well.  He does not have any evidence of cellulitis at this time.  He may now resume normal activities and follow-up with me on a as needed basis.    Rinku Wade DO [facility] Department of Surgery

## 2021-06-18 NOTE — PATIENT INSTRUCTIONS - HE
Patient Instructions by Yoandy Willis MD at 1/15/2021  9:00 AM     Author: Yoandy Wlilis MD Service: -- Author Type: Physician    Filed: 1/15/2021  9:56 AM Encounter Date: 1/15/2021 Status: Signed    : Yoandy Willis MD (Physician)         Patient Education   Signs of Hearing Loss  Hearing loss is a problem shared by many people. In fact, it is one of the most common health conditions, particularly as people age. Most people over age 65 have some hearing loss, and by age 80, almost everyone does. Because hearing loss usually occurs slowly over the years, you may not realize your hearing ability has gotten worse.       Have your hearing checked  Contact your [facility] care provider if you:    Have to strain to hear normal conversation.    Have to watch other peoples faces very carefully to follow what theyre saying.    Need to ask people to repeat what theyve said.    Often misunderstand what people are saying.    Turn the volume of the television or radio up so high that others complain.    Feel that people are mumbling when theyre talking to you.    Find that the effort to hear leaves you feeling tired and irritated.    Notice, when using the phone, that you hear better with 1 ear than the other.    2227-5264 [employer]. [address] [place] [address], [place] [address]. All rights reserved. This information is not intended as a substitute for professional medical care. Always follow your healthcare professional's instructions.         Patient Education   Understanding Advance Care Planning  Advance care planning is the process of deciding ones own future medical care. It helps ensure that if you cant speak for yourself, your wishes can still be carried out. The plan is a series of legal documents that note a persons wishes. The documents vary by state. Advance care planning may be done when a person has a serious illness that is expected to get worse. It may be done before major  surgery. And it can help you and your family be prepared in case of a major illness or injury. Advance care planning helps with making decisions at these times.       A health care proxy is a person who acts as the voice of a patient when the patient cant speak for himself or herself. The name of this role varies by state. It may be called a Durable Medical Power of  or Durable Power of  for Healthcare. It may be called an agent, surrogate, or advocate. Or it may be called a representative or decision maker. It is an official duty that is identified by a legal document. The document also varies by state.    Why Is Advance Care Planning Important?  If a person communicates their healthcare wishes:    They will be given medical care that matches their values and goals.    Their family members will not be forced to make decisions in a crisis with no guidance.  Creating a Plan  Making an advance care plan is often done in 3 steps:    Thinking about ones wishes. To create an advance care plan, you should think about what kind of medical treatment you would want if you lose the ability to communicate. Are there any situations in which you would refuse or stop treatment? Are there therapies you would want or not want? And whom do you want to make decisions for you? There are many places to learn more about how to plan for your care. Ask your doctor or  for resources.    Picking a health care proxy. This means choosing a trusted person to speak for you only when you cant speak for yourself. When you cannot make medical decisions, your proxy makes sure the instructions in your advance care plan are followed. A proxy does not make decisions based on his or her own opinions. They must put aside those opinions and values if needed, and carry out your wishes.    Filling out the legal documents. There are several kinds of legal documents for advance care planning. Each one tells health care providers  your wishes. The documents may vary by state. They must be signed and may need to be witnessed or notarized. You can cancel or change them whenever you wish. Depending on your state, the documents may include a Healthcare Proxy form, Living Will, Durable Medical Power of , Advance Directive, or others.  The Familys Role  The best help a family can give is to support their loved ones wishes. Open and honest communication is vital. Family should express any concerns they have about the patients choices while the patient can still make decisions.    8626-5642 [employer]. [address] [place] [address]. All rights reserved. This information is not intended as a substitute for professional medical care. Always follow your healthcare professional's instructions.         Also, [facility] offers a free, downloadable health care directive that allows you to share your treatment choices and personal preferences if you cannot communicate your wishes. It also allows you to appoint another person (called a health care agent) to make health care decisions if you are unable to do so. You can download an advance directive by going here: http://www.[employer].org/ki workState Reform School for [employer].html     Patient Education   Personalized Prevention Plan  You are due for the preventive services outlined below.  Your care team is available to assist you in scheduling these services.  If you have already completed any of these items, please share that information with your care team to update in your medical record.  Health Maintenance   Topic Date Due   ? HEPATITIS C SCREENING  1940   ? ZOSTER VACCINES (1 of 2) 07/13/1992   ? INFLUENZA VACCINE RULE BASED (1) 08/01/2020   ? ADVANCE CARE PLANNING  09/29/2021   ? MEDICARE ANNUAL WELLNESS VISIT  01/15/2022   ? FALL RISK ASSESSMENT  01/15/2022   ? LIPID  01/07/2025   ? TD 18+ HE  09/28/2027   ? Pneumococcal Vaccine: 65+ Years  Completed   ?  Pneumococcal Vaccine: Pediatrics (0 to 5 Years) and At-Risk Patients (6 to 64 Years)  Aged Out   ? HEPATITIS B VACCINES  Aged Out

## 2021-06-23 NOTE — PROGRESS NOTES
Steven comes in today for follow-up of his chronic medical conditions.  He states that he is feeling incredibly well right now and states that he feels the best he has in.  A complete review of systems is undertaken and was negative.  He is due for a refill on his simvastatin.  His cholesterol was recently drawn by his rheumatologist and revealed a total cholesterol of 166, LDL of 70, and an HDL of 43.  His triglycerides went up just a little bit to 255.  Apparently he was told that his cholesterol went up significantly by reassured him that his numbers looked fantastic and we did not need to change his dose of his simvastatin.  Blood pressure is at goal.  He still continues to take his allopurinol for his gout.  Uric acid was excellent on 1/16 as well.    Objective: Vital signs are as per the EMR.  General the patient is alert pleasant and in no acute distress.  He appears healthy.    Assessment and plan:    #1.  Hyperlipidemia in the context of a past stroke.  Cholesterol is excellent.  He will continue his simvastatin.    2.  Gout, well controlled on at allopurinol.  Continue.    3.  Hypertension, controlled.

## 2021-06-23 NOTE — TELEPHONE ENCOUNTER
RN cannot approve Refill Request    RN can NOT refill this medication PCP messaged that patient is overdue for Office Visit.    Carmen Murdock, [facility] Triage/Med Refill 1/14/2019    Requested Prescriptions   Pending Prescriptions Disp Refills     simvastatin (ZOCOR) 20 MG tablet [Pharmacy Med Name: SIMVASTATIN 20MG TABLETS] 90 tablet 0     Sig: TAKE 1 TABLET(20 MG) BY MOUTH AT BEDTIME    Statins Refill Protocol (Hmg CoA Reductase Inhibitors) Failed - 1/13/2019  1:27 PM       Failed - PCP or prescribing provider visit in past 12 months     Last office visit with prescriber/PCP: 9/29/2016 Yoandy Willis MD OR same dept: Visit date not found OR same specialty: 9/29/2016 Yoandy Willis MD  Last physical: 9/28/2017 Last MTM visit: Visit date not found   Next visit within 3 mo: Visit date not found  Next physical within 3 mo: Visit date not found  Prescriber OR PCP: Yoandy Willis MD  Last diagnosis associated with med order: 1. Hyperlipemia  - simvastatin (ZOCOR) 20 MG tablet [Pharmacy Med Name: SIMVASTATIN 20MG TABLETS]; TAKE 1 TABLET(20 MG) BY MOUTH AT BEDTIME  Dispense: 90 tablet; Refill: 0    If protocol passes may refill for 12 months if within 3 months of last provider visit (or a total of 15 months).

## 2021-06-23 NOTE — TELEPHONE ENCOUNTER
RN cannot approve Refill Request    RN can NOT refill this medication PCP messaged that patient is overdue for Office Visit.       Carmen Murdock, [facility] Triage/Med Refill 1/28/2019    Requested Prescriptions   Pending Prescriptions Disp Refills     simvastatin (ZOCOR) 20 MG tablet [Pharmacy Med Name: SIMVASTATIN 20MG TABLETS] 90 tablet 0     Sig: TAKE 1 TABLET(20 MG) BY MOUTH AT BEDTIME    Statins Refill Protocol (Hmg CoA Reductase Inhibitors) Failed - 1/26/2019 10:06 AM       Failed - PCP or prescribing provider visit in past 12 months     Last office visit with prescriber/PCP: 9/29/2016 Yoandy Willis MD OR same dept: Visit date not found OR same specialty: 9/29/2016 Yoandy Willis MD  Last physical: 9/28/2017 Last MTM visit: Visit date not found   Next visit within 3 mo: Visit date not found  Next physical within 3 mo: Visit date not found  Prescriber OR PCP: Yoandy Willis MD  Last diagnosis associated with med order: 1. Hyperlipemia  - simvastatin (ZOCOR) 20 MG tablet [Pharmacy Med Name: SIMVASTATIN 20MG TABLETS]; TAKE 1 TABLET(20 MG) BY MOUTH AT BEDTIME  Dispense: 90 tablet; Refill: 0    If protocol passes may refill for 12 months if within 3 months of last provider visit (or a total of 15 months).

## 2021-06-26 ENCOUNTER — HEALTH MAINTENANCE LETTER (OUTPATIENT)
Age: 81
End: 2021-06-26

## 2021-07-06 ENCOUNTER — COMMUNICATION - HEALTHEAST (OUTPATIENT)
Dept: ADMINISTRATIVE | Facility: CLINIC | Age: 81
End: 2021-07-06

## 2021-07-06 NOTE — TELEPHONE ENCOUNTER
Telephone Encounter by Mounika Malave LPN at 7/6/2021 10:20 AM     Author: Mounika Malave LPN Service: -- Author Type: Licensed Nurse    Filed: 7/6/2021 10:21 AM Encounter Date: 7/6/2021 Status: Signed    : Mounika Malave LPN (Licensed Nurse)       Patient needs to contact the pharmacy as he has refills on file there. (I verified this with the pharmacy.)    I tried reaching out to the patient but got a busy signal.

## 2021-07-06 NOTE — TELEPHONE ENCOUNTER
Telephone Encounter by Taylor Lew RT (R) at 7/6/2021 10:08 AM     Author: Taylor Lew RT (R) Service: -- Author Type: Radiologic Technologist    Filed: 7/6/2021 10:09 AM Encounter Date: 7/6/2021 Status: Signed    : Taylor Lew RT (R) (Radiologic Technologist)       ..Reason for Call:  Medication or medication refill: Refill    Do you use a [facility]?  Name of the pharmacy and phone number for the current request: [id number] - [place], [place] - [address] AT [facility] [address]  [phone]    Name of the medication requested: amLODIPine (NORVASC) 5 MG tablet    Other request: NONE     Can we leave a detailed message on this number? Yes    Phone number patient can be reached at: Home number on file [phone] (home)    Best Time: ANY    Call taken on 7/6/2021 at 10:09 AM by Taylor Lew

## 2021-07-07 NOTE — TELEPHONE ENCOUNTER
Telephone Encounter by Raymond LION CMA at 7/7/2021 12:53 PM     Author: Raymond LION CMA Service: -- Author Type: Certified Medical Assistant    Filed: 7/7/2021 12:54 PM Encounter Date: 7/6/2021 Status: Signed    : Raymond LION CMA (Certified Medical Assistant)       Relayed message to patient. Patient will call back if he runs into any problems with refilling his medications.      Rodolfo MÁRQUEZ CMA

## 2021-10-16 ENCOUNTER — HEALTH MAINTENANCE LETTER (OUTPATIENT)
Age: 81
End: 2021-10-16

## 2022-02-23 DIAGNOSIS — M10.9 ACUTE GOUTY ARTHRITIS: ICD-10-CM

## 2022-02-23 NOTE — TELEPHONE ENCOUNTER
"Routing refill request to provider for review/approval because:  Labs not current:  Multiple  Patient needs to be seen because it has been more than 1 year since last office visit.    Last Written Prescription Date:  2/5/21  Last Fill Quantity: 90,  # refills: 3   Last office visit provider:  2/5/21     Requested Prescriptions   Pending Prescriptions Disp Refills     allopurinol (ZYLOPRIM) 300 MG tablet [Pharmacy Med Name: ALLOPURINOL 300MG TABLETS] 90 tablet 3     Sig: TAKE 1 TABLET(300 MG) BY MOUTH DAILY       Gout Agents Protocol Failed - 2/23/2022  2:41 PM        Failed - CBC on file in past 12 months     Recent Labs   Lab Test 07/09/20  1650   WBC 7.0   RBC 5.27   HGB 15.6   HCT 46.7                    Failed - ALT on file in past 12 months     Recent Labs   Lab Test 01/15/21  0955   ALT 50*             Failed - Has Uric Acid on file in past 12 months and value is less than 6     Recent Labs   Lab Test 01/15/21  0955   URIC 5.6     If level is 6mg/dL or greater, ok to refill one time and refer to provider.           Failed - Normal serum creatinine on file in the past 12 months     Recent Labs   Lab Test 01/15/21  0955   CR 1.03       Ok to refill medication if creatinine is low          Passed - Recent (12 mo) or future (30 days) visit within the authorizing provider's specialty     Patient has had an office visit with the authorizing provider or a provider within the authorizing providers department within the previous 12 mos or has a future within next 30 days. See \"Patient Info\" tab in inbasket, or \"Choose Columns\" in Meds & Orders section of the refill encounter.              Passed - Medication is active on med list        Passed - Patient is age 18 or older             Haris Bruner RN 02/23/22 2:41 PM  "

## 2022-02-24 PROBLEM — Z23 ENCOUNTER FOR IMMUNIZATION: Status: RESOLVED | Noted: 2021-01-29 | Resolved: 2022-02-24

## 2022-02-24 PROBLEM — K43.9 VENTRAL HERNIA WITHOUT OBSTRUCTION OR GANGRENE: Status: RESOLVED | Noted: 2021-01-27 | Resolved: 2022-02-24

## 2022-02-24 RX ORDER — ALLOPURINOL 300 MG/1
TABLET ORAL
Qty: 90 TABLET | Refills: 3 | Status: SHIPPED | OUTPATIENT
Start: 2022-02-24

## 2022-03-01 ENCOUNTER — MYC MEDICAL ADVICE (OUTPATIENT)
Dept: INTERNAL MEDICINE | Facility: CLINIC | Age: 82
End: 2022-03-01

## 2022-03-01 ENCOUNTER — TELEPHONE (OUTPATIENT)
Dept: INTERNAL MEDICINE | Facility: CLINIC | Age: 82
End: 2022-03-01

## 2022-03-01 DIAGNOSIS — E78.5 HYPERLIPIDEMIA LDL GOAL <100: ICD-10-CM

## 2022-03-01 NOTE — TELEPHONE ENCOUNTER
Reason for Call:  Other call back    Detailed comments: Steven called, trying to access his mychart. Unable to access it he hung up and said whatever the message was probably wasn't that important anyway. I just wanted to let you know so someone could call him back and give him the message over the phone.    Phone Number Patient can be reached at: Home number on file [phone] (home)    Best Time:     Can we leave a detailed message on this number? Not Applicable    Call taken on 3/1/2022 at 2:58 PM by Tiff Carroll

## 2022-03-01 NOTE — TELEPHONE ENCOUNTER
I called patient back and talked to him about the [place] message I sent him earlier. Nothing further needed.

## 2022-03-03 RX ORDER — SIMVASTATIN 20 MG
TABLET ORAL
Qty: 90 TABLET | Refills: 2 | Status: SHIPPED | OUTPATIENT
Start: 2022-03-03

## 2022-03-03 NOTE — TELEPHONE ENCOUNTER
"Routing refill request to provider for review/approval because:  Labs not current:  LDL  Patient needs to be seen because it has been more than 1 year since last office visit.  Early refill requested.    Last Written Prescription Date:  8/26/21  Last Fill Quantity: 90,  # refills: 2   Last office visit provider:  2/5/21     Requested Prescriptions   Pending Prescriptions Disp Refills     simvastatin (ZOCOR) 20 MG tablet [Pharmacy Med Name: SIMVASTATIN 20MG TABLETS] 90 tablet 2     Sig: TAKE 1 TABLET(20 MG) BY MOUTH AT BEDTIME       Statins Protocol Failed - 3/1/2022  8:02 AM        Failed - LDL on file in past 12 months     Recent Labs   Lab Test 01/15/21  0955   LDL 82             Failed - Recent (12 mo) or future (30 days) visit within the authorizing provider's specialty     Patient has had an office visit with the authorizing provider or a provider within the authorizing providers department within the previous 12 mos or has a future within next 30 days. See \"Patient Info\" tab in inbasket, or \"Choose Columns\" in Meds & Orders section of the refill encounter.              Passed - No abnormal creatine kinase in past 12 months     No lab results found.             Passed - Medication is active on med list        Passed - Patient is age 18 or older             Haris Bruner RN 03/03/22 7:23 AM  "

## 2022-04-02 ENCOUNTER — HEALTH MAINTENANCE LETTER (OUTPATIENT)
Age: 82
End: 2022-04-02

## 2022-09-25 ENCOUNTER — HEALTH MAINTENANCE LETTER (OUTPATIENT)
Age: 82
End: 2022-09-25

## 2023-05-13 ENCOUNTER — HEALTH MAINTENANCE LETTER (OUTPATIENT)
Age: 83
End: 2023-05-13